# Patient Record
Sex: MALE | Race: WHITE
[De-identification: names, ages, dates, MRNs, and addresses within clinical notes are randomized per-mention and may not be internally consistent; named-entity substitution may affect disease eponyms.]

---

## 2017-03-23 ENCOUNTER — HOSPITAL ENCOUNTER (OUTPATIENT)
Dept: HOSPITAL 75 - PREOP | Age: 67
End: 2017-03-23
Attending: ORTHOPAEDIC SURGERY
Payer: MEDICARE

## 2017-03-23 VITALS — WEIGHT: 180 LBS | BODY MASS INDEX: 26.66 KG/M2 | HEIGHT: 69 IN

## 2017-03-23 DIAGNOSIS — Z01.818: Primary | ICD-10-CM

## 2017-03-27 NOTE — HISTORY AND PHYSICAL
DATE OF ADMISSION: 03/29/2017  

 

DICTATING PHYSICIAN:  

Dr. Louie 

 

This will be for outpatient surgery for right thumb giant cell

excision. 

 

HISTORY:

The patient is a 66-year-old left-hand-dominant gentleman with

complaints of mass on the dorsal aspect of his right thumb. He

reports that this has been present for several months and has

grown in size. He reports it is tender. He denies any specific

injuries.  Due to interference with activities of daily living,

the patient has elected to proceed with surgical intervention. 

 

REVIEW OF SYSTEMS:

No chest pain, no shortness of breath. No dysuria. 

 

PAST MEDICAL HISTORY:

1.   Stroke.  

2.   Paralyzed diaphragm.  

3.   Prostate cancer. 

 

PAST SURGICAL HISTORY:

1.   Coronary.  

2.   Prostate.  

3.   Shoulder and knee scope. 

 

FAMILY HISTORY:

Significant for diabetes. 

 

PRIMARY CARE PROVIDER:

Dr. Mcmanus. 

 

MEDICATIONS:

1.   Celebrex 

2.   Coreg 

3.   Mucinex 

 

ALLERGIES: 

Plavix. 

 

SOCIAL HISTORY:

The patient drinks alcohol socially. She denies tobacco use. 

 

Radiographs reveal no acute or degenerative changes. 

 

PHYSICAL EXAMINATION: The patient is well-developed,

well-nourished, in no acute distress. 

HEENT: Normocephalic, atraumatic. Pupils are equal, round, and

reactive to light.  

OROPHARYNX: Clear. 

NECK: Supple. No lymphadenopathy. 

LUNGS: Clear to auscultation bilaterally. 

HEART: Regular rate and rhythm. 

ABDOMEN: Soft, nontender, nondistended. 

EXTREMITY EXAM: The right thumb demonstrates a mass dorsally for

the DIP joint extending just proximal to the nail plate and

extending over to the radial aspect just proximal to the DIP

joint. There is no erythema or warmth. This is mildly tender to

palpation. There is no fluctuance noted. No skin lesions are

noted. 

 

IMPRESSION:

Giant cell tumor, right thumb. 

 

PLAN:

Right thumb giant cell tumor excision. The risks, benefits,

options, ramifications and recovery were discussed in length with

the patient.  He understands wishes to proceed. 

 

 

 

Job ID: [id number] 

Dictated Date: 03/20/2017 12:15:00 

Transcription Date: 03/21/2017 08:19:52/brady

## 2017-03-29 ENCOUNTER — HOSPITAL ENCOUNTER (OUTPATIENT)
Dept: HOSPITAL 75 - SDC | Age: 67
Discharge: HOME | End: 2017-03-29
Attending: ORTHOPAEDIC SURGERY
Payer: MEDICARE

## 2017-03-29 VITALS — SYSTOLIC BLOOD PRESSURE: 150 MMHG | DIASTOLIC BLOOD PRESSURE: 107 MMHG

## 2017-03-29 VITALS — DIASTOLIC BLOOD PRESSURE: 97 MMHG | SYSTOLIC BLOOD PRESSURE: 146 MMHG

## 2017-03-29 VITALS — HEIGHT: 69 IN | WEIGHT: 180 LBS | BODY MASS INDEX: 26.66 KG/M2

## 2017-03-29 VITALS — DIASTOLIC BLOOD PRESSURE: 87 MMHG | SYSTOLIC BLOOD PRESSURE: 129 MMHG

## 2017-03-29 VITALS — DIASTOLIC BLOOD PRESSURE: 98 MMHG | SYSTOLIC BLOOD PRESSURE: 151 MMHG

## 2017-03-29 DIAGNOSIS — Z11.2: ICD-10-CM

## 2017-03-29 DIAGNOSIS — Z86.73: ICD-10-CM

## 2017-03-29 DIAGNOSIS — D48.1: Primary | ICD-10-CM

## 2017-03-29 DIAGNOSIS — I10: ICD-10-CM

## 2017-03-29 DIAGNOSIS — R22.31: ICD-10-CM

## 2017-03-29 DIAGNOSIS — Z85.46: ICD-10-CM

## 2017-03-29 PROCEDURE — 87081 CULTURE SCREEN ONLY: CPT

## 2017-03-29 PROCEDURE — 88305 TISSUE EXAM BY PATHOLOGIST: CPT

## 2017-03-29 NOTE — PROGRESS NOTE-POST OPERATIVE
Post-Operative Progess Note


Surgeon (s)/Assistant (s)


Surgeon


RANDY MALIN MD


Assistant:  Zheng Blount





Pre-Operative Diagnosis


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





Post-Operative Diagnosis





right thumb giant cell tumor of the tendon sheath





Post-Op Procedure Note


Date of Procedure:  Mar 29, 2017


Name of Procedure Performed:  


right thumb giant cell tumor excision from the tendon sheath


Description of the Procedure:  


see operative report


Findings of the Procedure


cystic mass


Anesthesia Type


Geta


Estimated blood loss (mL):  minimal


Packing:  


none


Specimen(s) collected/removed


giant cell tumor











RANDY MALIN MD Mar 29, 2017 08:26

## 2017-03-29 NOTE — PROGRESS NOTE-PRE OPERATIVE
Pre-Operative Progress Note


H&P Reviewed


The H&P was reviewed, patient examined and no changes noted.


Date H&P Reviewed:  Mar 29, 2017


Time H&P Reviewed:  08:25


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











RANDY MALIN MD Mar 29, 2017 08:25

## 2017-03-30 NOTE — OPERATIVE REPORT
PROCEDURE PHYSICIAN:   RANDY MALIN

 

DATE OF PROCEDURE:  

03/29/2017

 

PREOPERATIVE DIAGNOSIS: 

Right thumb giant cell tumor of the tendon sheath

 

POSTOPERATIVE DIAGNOSIS:

Right thumb giant cell tumor of the tendon sheath. 

 

PROCEDURE:

Right thumb giant cell tumor of the tendon sheath excision  

 

SURGEON:

Jacy

 

ASSISTANT: 

Zheng Blount who assisted throughout the procedure and closed the

incision. 

 

ANESTHESIA:

General endotracheal by Corinna Croft CRNA. 

 

TOURNIQUET TIME:  

12 minutes at 250 mmHg. 

 

ESTIMATED BLOOD LOSS:

Minimal. 

 

DRAINS:

None. 

 

COMPLICATIONS:

None

 

PATHOLOGIC SPECIMEN: 

Sent.

 

The patient was transported to the recovery room, awake, in

stable condition. 

 

STATEMENT OF MEDICAL NECESSITY: 

The patient is a 66-year-old, right-hand-dominant gentleman with

complaints of mass on the dorsal aspect of his right thumb from

the DIP joint extending distally. He had ridge in his nail plate

and the patient was counseled that he may always have an abnormal

nail plate. He understood this. He also understood that if this

was a giant cell tumor that there was a relatively high risk of

recurrence. 

 

PROCEDURE:

After risks and benefits of procedure were discussed and

questions were answered an informed consent was signed and placed

on the chart. The operative site was confirmed in the

preoperative holding and initialed by the surgeon. The patient

was then transported to the operating room and after adequate

levels of general endotracheal anesthetic were obtained, a

timeout was called confirming the operative site. The right upper

extremity prepped and draped in the usual sterile fashion. With

arm elevated, the tourniquet was inflated to 250 mmHg.  A

V-shaped incision was made over the mass. The underlying soft

tissues were carefully dissected. There was a cystic mass noted

just proximal to the nail plate. This was decompressed. There was

hypertrophic tissue noted on the radial aspect of the extensor

tendon sheath, which was excised. The sheath was incised as well

exposing the DIP joint where there is hypertrophic synovium which

was excised and sent for pathologic examination. No other masses

were noted.  The wound was copiously irrigated. The extensor

tendon opening was closed with 4-0 Vicryl in horizontal mattress

interrupted fashion. The tourniquet was deflated for total

tourniquet time of 12 minutes. Pressure was used for hemostasis.

The wound was further irrigated. 4-0 nylon was used to

reapproximate the skin incision in simple interrupted fashion. A

thumb block was placed using plain Marcaine. A soft dressing was

applied and the patient was transported to the recovery room,

awake, in stable condition.  

 

 

Job ID: [id number]

Dictated Date: 03/29/2017 10:50:09 

Transcription Date: 03/30/2017 11:48:35 / ronald

## 2019-03-20 ENCOUNTER — HOSPITAL ENCOUNTER (OUTPATIENT)
Dept: HOSPITAL 75 - PREOP | Age: 69
Discharge: HOME | End: 2019-03-20
Attending: ORTHOPAEDIC SURGERY
Payer: MEDICARE

## 2019-03-20 VITALS — WEIGHT: 184.06 LBS | BODY MASS INDEX: 27.26 KG/M2 | HEIGHT: 69 IN

## 2019-03-20 DIAGNOSIS — Z01.818: Primary | ICD-10-CM

## 2019-03-20 PROCEDURE — 87081 CULTURE SCREEN ONLY: CPT

## 2019-03-27 ENCOUNTER — HOSPITAL ENCOUNTER (OUTPATIENT)
Dept: HOSPITAL 75 - SDC | Age: 69
Discharge: HOME | End: 2019-03-27
Attending: ORTHOPAEDIC SURGERY
Payer: MEDICARE

## 2019-03-27 VITALS — DIASTOLIC BLOOD PRESSURE: 98 MMHG | SYSTOLIC BLOOD PRESSURE: 138 MMHG

## 2019-03-27 VITALS — WEIGHT: 184.06 LBS | HEIGHT: 69 IN | BODY MASS INDEX: 27.26 KG/M2

## 2019-03-27 VITALS — SYSTOLIC BLOOD PRESSURE: 146 MMHG | DIASTOLIC BLOOD PRESSURE: 93 MMHG

## 2019-03-27 VITALS — SYSTOLIC BLOOD PRESSURE: 153 MMHG | DIASTOLIC BLOOD PRESSURE: 95 MMHG

## 2019-03-27 VITALS — DIASTOLIC BLOOD PRESSURE: 91 MMHG | SYSTOLIC BLOOD PRESSURE: 142 MMHG

## 2019-03-27 DIAGNOSIS — M94.262: ICD-10-CM

## 2019-03-27 DIAGNOSIS — M23.222: Primary | ICD-10-CM

## 2019-03-27 DIAGNOSIS — Z85.46: ICD-10-CM

## 2019-03-27 DIAGNOSIS — Z86.73: ICD-10-CM

## 2019-03-27 DIAGNOSIS — Z87.891: ICD-10-CM

## 2019-03-27 DIAGNOSIS — J45.909: ICD-10-CM

## 2019-03-27 DIAGNOSIS — I10: ICD-10-CM

## 2019-03-27 DIAGNOSIS — Z79.899: ICD-10-CM

## 2019-03-27 DIAGNOSIS — M25.462: ICD-10-CM

## 2019-03-27 DIAGNOSIS — Z79.82: ICD-10-CM

## 2019-03-27 RX ADMIN — SODIUM CHLORIDE, SODIUM LACTATE, POTASSIUM CHLORIDE, AND CALCIUM CHLORIDE PRN MLS/HR: 600; 310; 30; 20 INJECTION, SOLUTION INTRAVENOUS at 12:11

## 2019-03-27 RX ADMIN — SODIUM CHLORIDE, SODIUM LACTATE, POTASSIUM CHLORIDE, AND CALCIUM CHLORIDE PRN MLS/HR: 600; 310; 30; 20 INJECTION, SOLUTION INTRAVENOUS at 10:10

## 2019-03-27 NOTE — OPERATIVE REPORT
DATE OF SERVICE:  03/27/2019



PREOPERATIVE DIAGNOSES:

1.  Left knee lateral meniscus tear.

2.  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



POSTOPERATIVE DIAGNOSES:

1.  Left knee medial meniscus tear.

2.  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

3.  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

4.  Left knee chondromalacia of the trochlea.



PROCEDURES:

1.  Left knee arthroscopic partial medial meniscectomy.

2.  Left knee arthroscopic chondroplasty of the medial tibial plateau.

3.  Left knee arthroscopic chondroplasty of the lateral tibial plateau.

4.  Left knee arthroscopic chondroplasty of the trochlea.



SURGEON:

Randy Malin MD.



ASSISTANT:

LISANDRA Laird.



ANESTHESIA:

General endotracheal by Dr. Galo.



TOURNIQUET TIME:

Not applicable.



ESTIMATED BLOOD LOSS:

Minimal.



DRAINS:

None.



COMPLICATIONS:

None.



POSTOPERATIVE PLANS:

Routine arthroscopy protocol.  The patient was transferred to the recovery room

awake and in stable condition.



STATEMENT OF MEDICAL NECESSITY:

The patient is a 68-year-old gentleman with complaints of left lateral knee

pain, catching, locking and swelling.  He is tender along his lateral joint line

and pain with patellar loading as well.  He had a moderate effusion noted.  It

was felt that he likely had lateral meniscus tears with associated

chondromalacia and due to functional impairment and failure to improve with

conservative measures, the patient elected to proceed with surgical

intervention.  Examination under anesthesia revealed range of motion of 0/0/130

with a negative Lachman, negative anterior and posterior drawer.  No varus

valgus laxity and negative pivot shift.  Arthroscopic findings, the patella

demonstrated grade I chondral softening with no significant unstable chondral

flaps.  The trochlea demonstrated grade III chondral loss over the lateral

aspect of the groove with surrounding grade III chondral flaps and a 15 x 10

area.  The medial and lateral gutters were clear.  The ACL and PCL were intact. 

Medial compartment demonstrated a tear of the posterior horn/body junction of

the medial meniscus involving approximately 30% of the junction.  In addition,

there were grade II chondral flaps in the central portion of the tibial plateau

and a 10 x 10 area.  The lateral compartment demonstrated a grade II chondral

flap over the most posterolateral corner of the joint surface and an 8 x 10

area.



DESCRIPTION OF PROCEDURE:

After risks and benefits of procedure were discussed and questions were answered

and informed consent was signed and placed on chart, the patient was then

transferred to the operating room and after adequate levels of general

endotracheal anesthetic were obtained, a timeout was called confirming the

operative site.  Examination under anesthesia was performed with the above

findings noted.  The left lower extremity was prepped and draped in the usual

sterile fashion.  The knee joint was injected with 60 mL of fluid.  A standard

inferolateral portal was placed for the arthroscope and inflow.  Under direct

visualization, inferomedial portal was created.  The menisci and cruciates were

carefully probed with the above findings noted.  The unstable chondral flaps of

the trochlea were debrided with a shaver back to a stable edge.  Scope was then

redirected into the medial compartment and then unstable chondral flaps of the

medial tibial plateau and the medial meniscus tear was debrided with a biter and

a shaver back to a stable edge.  This was carefully probed.  No further tearing

or instability noted.  The scope was redirected into the lateral compartment and

the unstable chondral flaps of the lateral tibial plateau were debrided with a

shave back to stable edge.  The knee was copiously irrigated.  The portal sites

were closed with 4-0 nylon in simple interrupted fashion.  The knee was injected

with Duramorph.  Port sites were infiltrated with plain Marcaine.  A soft

dressing was applied and the patient was transferred to recovery room awake and

in stable condition.





Job ID: [id number]

DocumentID: [id number]

Dictated Date:  03/27/2019 11:53:37

Transcription Date: 03/27/2019 16:28:38

Dictated By: RANDY MALIN MD

## 2019-03-27 NOTE — PROGRESS NOTE-PRE OPERATIVE
Pre-Operative Progress Note


H&P Reviewed


The H&P was reviewed, patient examined and no changes noted.


Date Seen by Provider:  Mar 27, 2019


Time Seen by Provider:  11:17


Date H&P Reviewed:  Mar 27, 2019


Time H&P Reviewed:  11:17


Pre-Operative Diagnosis:  left knee lateral meniscus tear and chondromalacia











RANDY MALIN MD Mar 27, 2019 11:18

## 2019-03-27 NOTE — PROGRESS NOTE-POST OPERATIVE
Post-Operative Progess Note


Surgeon (s)/Assistant (s)


Surgeon


RANDY MALIN MD


Assistant:  Zheng Blount





Pre-Operative Diagnosis


left knee lateral meniscus tear and chondromalacia





Post-Operative Diagnosis





left knee medial  meniscus tear and chondromalacia of the medial and


lateral tibial plateaus and trochlea





Procedure & Operative Findings


Date of Procedure


3/27/19


Procedure Performed/Findings


left knee arthroscopic partial medial  meniscectomy and chondroplasty of medial 

and lateral tibial plateau and trochlea


Anesthesia Type


GETA





Estimated Blood Loss


Estimated blood loss (mL):  minimal





Specimens/Packing


Specimens Removed


none


Packing:  


none











RANDY MALIN MD Mar 27, 2019 11:19

## 2019-03-27 NOTE — ANESTHESIA-GENERAL POST-OP
General


Patient Condition


Mental Status/LOC:  Same as Preop


Cardiovascular:  Satisfactory


Nausea/Vomiting:  Absent


Respiratory:  Satisfactory


Pain:  Controlled


Complications:  Absent





Post Op Complications


Complications


None





Follow Up Care/Instructions


Patient Instructions


None needed.





Anesthesia/Patient Condition


Patient Condition


Patient is doing well, no complaints, stable vital signs, no apparent adverse 

anesthesia problems.   


No complications reported per nursing.











MG TAM CRNA Mar 27, 2019 13:30

## 2019-03-27 NOTE — PHYSICAL THERAPY ORTHO EVAL
PT Orthopedic Evaluation


Type of Surgery


Knee Scope


left side





Prior Level of Function


Current Living Status:  Spouse


Locomotion     (Upon Admit):  Independent


Established Durable Medical Eq:  Crutches





Subjective


Subjective


Patient in bed pre tx, agrees to PT, has no complaints of pain.


Entry Into Home:  Ramp





Objective


Objective


Patient has intact light touch sensation in left leg.





Motor Control


Motor Control:  Motor Control WNL





ROM


left knee extension +1 degrees, flexion 95 degrees





Strength


NT





Transfer


Transfers (B, C, W/C) (FIM):  5





Gait


Gait Assistive Device:  Crutches


Left Lower Extremity:  Left


Weight Bearing Status LLE:  Weight Bearing/Tolerated


Gait (FIM):  5


Distance:  200'


Gait Level of Assist:  5


Summary/Comments


Patient ambulated 200' with axillary crutches with SBA and cues for gait 

pattern.  Patient had no LOB.  He also went up and down 1 step using crutches 

with SBA.





Treatment Rendered


Treatment:  Therapeutic Exercises, Gait Train, Step Train


Exercise Instruction:  Quad Sets, Heel Slides, Ankle Pumps





Assessment/Goals


Goal Time Frame:  1 Visit





Plan


Treatment Plan:  Discharge


PT/Family Agrees to Plan:  Yes





Time


Time In:  1310


Time Out:  1330


Total Billed Treatment Time:  20


Billed Treatment Time


1 visit


EVL 20'











VICKI HUNTER PT Mar 27, 2019 13:40

## 2019-04-01 NOTE — HISTORY AND PHYSICAL
DATE OF SERVICE:  



ADMISSION HISTORY AND PHYSICAL



This will be for left knee arthroscopy on 03/27/2019.



HISTORY OF PRESENT ILLNESS:

The patient is a 68-year-old gentleman who injured his left knee 1 year ago

while splitting wood.  He had a piece of wood striking the anterior aspect of

his knee and since then there is swelling, pain, catching and locking.  Reports

pain on the posterolateral aspect of the knee.  He reports no improvement with

rest, activity modifications, anti-inflammatories.  Prior to this injury, he had

no knee issues.  Due to functional impairment and failure to improve with

conservative measures, the patient elected to proceed with surgical

intervention.



REVIEW OF SYSTEMS:

No chest pain, no shortness of breath.  No dysuria.



PAST MEDICAL HISTORY:

Significant for CVA, paralyzed diaphragm and prostate cancer.



PAST SURGICAL HISTORY:

Thumb, cholecystectomy, prostate, coronary, left shoulder, left knee, right

clavicle, right rotator cuff.



FAMILY HISTORY:

Diabetes.



MEDICATIONS:

Celebrex, Coreg, aspirin.



ALLERGIES:

PLAVIX.



SOCIAL HISTORY:

The patient denies alcohol, tobacco use.



PHYSICAL EXAMINATION:

GENERAL:  The patient is well developed, well nourished, in no acute distress.

HEENT:  Normocephalic, atraumatic.  Pupils are equal, round, reactive to light. 

Oropharynx is clear.

NECK:  Supple.  No lymphadenopathy.

LUNGS:  Clear to auscultation bilaterally.

HEART:  Regular rate and rhythm.

ABDOMEN:  Soft, nontender, nondistended.

EXTREMITIES:  The left knee demonstrates moderate effusion.  He is tender along

his posterolateral joint and has pain posteriorly with hyperflexion with

James's.  No varus valgus laxity.  Negative anterior and posterior drawer. 

Ambulates with an antalgic gait.  There is no varus valgus laxity.



Radiographs reveal moderate patellofemoral arthrosis.



IMPRESSION:

Left knee lateral meniscus tear with chondromalacia patella.



PLAN:

Left knee arthroscopy with partial lateral meniscectomy and chondroplasty.  The

risks, benefits, options, ramifications and recovery were discussed at length

with the patient.  He understands and wished to proceed.  This will be for

outpatient surgery on 03/27/2019.





Job ID: [id number]

DocumentID: [id number]

Dictated Date:  03/18/2019 16:03:15

Transcription Date: 03/18/2019 16:49:27

Dictated By: RANDY MALIN MD









<Dictated by RANDY MALIN MD> 

<Electronically signed by RANDY MALIN MD> 03/20/19 0718